# Patient Record
Sex: FEMALE | Race: WHITE | NOT HISPANIC OR LATINO
[De-identification: names, ages, dates, MRNs, and addresses within clinical notes are randomized per-mention and may not be internally consistent; named-entity substitution may affect disease eponyms.]

---

## 2020-07-08 PROBLEM — Z00.00 ENCOUNTER FOR PREVENTIVE HEALTH EXAMINATION: Status: ACTIVE | Noted: 2020-07-08

## 2020-07-22 ENCOUNTER — APPOINTMENT (OUTPATIENT)
Dept: ORTHOPEDIC SURGERY | Facility: CLINIC | Age: 65
End: 2020-07-22

## 2023-07-12 ENCOUNTER — OFFICE (OUTPATIENT)
Dept: URBAN - METROPOLITAN AREA CLINIC 110 | Facility: CLINIC | Age: 68
Setting detail: OPHTHALMOLOGY
End: 2023-07-12
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
## 2023-07-12 PROCEDURE — NO SHOW FE NO SHOW FEE: Performed by: OPHTHALMOLOGY

## 2023-12-21 ENCOUNTER — APPOINTMENT (OUTPATIENT)
Dept: CARDIOLOGY | Facility: CLINIC | Age: 68
End: 2023-12-21

## 2024-01-09 ENCOUNTER — APPOINTMENT (OUTPATIENT)
Dept: CARDIOLOGY | Facility: CLINIC | Age: 69
End: 2024-01-09
Payer: COMMERCIAL

## 2024-01-09 ENCOUNTER — NON-APPOINTMENT (OUTPATIENT)
Age: 69
End: 2024-01-09

## 2024-01-09 VITALS
SYSTOLIC BLOOD PRESSURE: 126 MMHG | RESPIRATION RATE: 15 BRPM | HEIGHT: 70 IN | HEART RATE: 83 BPM | OXYGEN SATURATION: 96 % | BODY MASS INDEX: 41.95 KG/M2 | DIASTOLIC BLOOD PRESSURE: 79 MMHG | WEIGHT: 293 LBS

## 2024-01-09 DIAGNOSIS — Z82.49 FAMILY HISTORY OF ISCHEMIC HEART DISEASE AND OTHER DISEASES OF THE CIRCULATORY SYSTEM: ICD-10-CM

## 2024-01-09 DIAGNOSIS — G62.9 POLYNEUROPATHY, UNSPECIFIED: ICD-10-CM

## 2024-01-09 DIAGNOSIS — K21.9 GASTRO-ESOPHAGEAL REFLUX DISEASE W/OUT ESOPHAGITIS: ICD-10-CM

## 2024-01-09 DIAGNOSIS — Z78.9 OTHER SPECIFIED HEALTH STATUS: ICD-10-CM

## 2024-01-09 DIAGNOSIS — Z87.891 PERSONAL HISTORY OF NICOTINE DEPENDENCE: ICD-10-CM

## 2024-01-09 DIAGNOSIS — R61 GENERALIZED HYPERHIDROSIS: ICD-10-CM

## 2024-01-09 PROCEDURE — 99205 OFFICE O/P NEW HI 60 MIN: CPT | Mod: 25

## 2024-01-09 PROCEDURE — 93000 ELECTROCARDIOGRAM COMPLETE: CPT

## 2024-01-09 PROCEDURE — 99204 OFFICE O/P NEW MOD 45 MIN: CPT | Mod: 25

## 2024-01-19 ENCOUNTER — RX ONLY (RX ONLY)
Age: 69
End: 2024-01-19

## 2024-01-19 ENCOUNTER — OFFICE (OUTPATIENT)
Dept: URBAN - METROPOLITAN AREA CLINIC 104 | Facility: CLINIC | Age: 69
Setting detail: OPHTHALMOLOGY
End: 2024-01-19
Payer: COMMERCIAL

## 2024-01-19 DIAGNOSIS — E11.9: ICD-10-CM

## 2024-01-19 DIAGNOSIS — H16.223: ICD-10-CM

## 2024-01-19 DIAGNOSIS — H40.1131: ICD-10-CM

## 2024-01-19 DIAGNOSIS — H25.12: ICD-10-CM

## 2024-01-19 DIAGNOSIS — H25.13: ICD-10-CM

## 2024-01-19 PROBLEM — H40.1132 POAG; BOTH EYES MODERATE: Status: ACTIVE | Noted: 2024-01-19

## 2024-01-19 PROBLEM — H25.11 CATARACT SENILE NUCLEAR SCLEROSIS; RIGHT EYE, LEFT EYE, BOTH EYES: Status: ACTIVE | Noted: 2024-01-19

## 2024-01-19 PROBLEM — H43.813 POSTERIOR VITREOUS DETACHMENT; BOTH EYES: Status: ACTIVE | Noted: 2024-01-19

## 2024-01-19 PROCEDURE — 92133 CPTRZD OPH DX IMG PST SGM ON: CPT | Performed by: OPHTHALMOLOGY

## 2024-01-19 PROCEDURE — 92136 OPHTHALMIC BIOMETRY: CPT | Mod: TC | Performed by: OPHTHALMOLOGY

## 2024-01-19 PROCEDURE — 92014 COMPRE OPH EXAM EST PT 1/>: CPT | Performed by: OPHTHALMOLOGY

## 2024-01-19 PROCEDURE — 92136 OPHTHALMIC BIOMETRY: CPT | Mod: 26,LT | Performed by: OPHTHALMOLOGY

## 2024-01-19 ASSESSMENT — REFRACTION_MANIFEST
OD_CYLINDER: -0.75
OD_SPHERE: -1.00
OS_VA1: 20/40
OS_SPHERE: -3.25
OS_CYLINDER: -1.00
OD_VA1: 20/40
OS_AXIS: 130
OD_AXIS: 160

## 2024-01-19 ASSESSMENT — SUPERFICIAL PUNCTATE KERATITIS (SPK)
OS_SPK: 1+
OD_SPK: T

## 2024-01-19 ASSESSMENT — LID POSITION - PTOSIS
OS_PTOSIS: LUL 1+
OD_PTOSIS: RUL 1+

## 2024-01-19 ASSESSMENT — SPHEQUIV_DERIVED
OS_SPHEQUIV: -3.75
OD_SPHEQUIV: -1.375
OD_SPHEQUIV: -1.125
OS_SPHEQUIV: -3.75

## 2024-01-19 ASSESSMENT — REFRACTION_CURRENTRX
OS_OVR_VA: 20/
OS_SPHERE: +0.50
OS_AXIS: 18
OD_CYLINDER: -0.50
OD_ADD: +2.00
OD_SPHERE: +0.75
OD_OVR_VA: 20/
OD_AXIS: 66
OS_CYLINDER: -1.25
OS_ADD: +2.00

## 2024-01-19 ASSESSMENT — REFRACTION_AUTOREFRACTION
OD_SPHERE: -0.75
OS_AXIS: 127
OS_SPHERE: -3.25
OS_CYLINDER: -1.00
OD_AXIS: 157
OD_CYLINDER: -0.75

## 2024-01-19 ASSESSMENT — LID POSITION - DERMATOCHALASIS
OD_DERMATOCHALASIS: RUL 2+
OS_DERMATOCHALASIS: LUL 2+

## 2024-01-19 ASSESSMENT — CONFRONTATIONAL VISUAL FIELD TEST (CVF)
OS_FINDINGS: FULL
OD_FINDINGS: FULL

## 2024-02-08 ENCOUNTER — RX ONLY (RX ONLY)
Age: 69
End: 2024-02-08

## 2024-02-08 ENCOUNTER — ASC (OUTPATIENT)
Dept: URBAN - METROPOLITAN AREA SURGERY 8 | Facility: SURGERY | Age: 69
Setting detail: OPHTHALMOLOGY
End: 2024-02-08
Payer: COMMERCIAL

## 2024-02-08 DIAGNOSIS — H25.89: ICD-10-CM

## 2024-02-08 DIAGNOSIS — H52.212: ICD-10-CM

## 2024-02-08 DIAGNOSIS — H25.12: ICD-10-CM

## 2024-02-08 PROCEDURE — V2787 ASTIGMATISM-CORRECT FUNCTION: HCPCS | Performed by: OPHTHALMOLOGY

## 2024-02-08 PROCEDURE — 66982 XCAPSL CTRC RMVL CPLX WO ECP: CPT | Mod: LT | Performed by: OPHTHALMOLOGY

## 2024-02-09 ENCOUNTER — OFFICE (OUTPATIENT)
Dept: URBAN - METROPOLITAN AREA CLINIC 104 | Facility: CLINIC | Age: 69
Setting detail: OPHTHALMOLOGY
End: 2024-02-09
Payer: COMMERCIAL

## 2024-02-09 DIAGNOSIS — Z96.1: ICD-10-CM

## 2024-02-09 PROCEDURE — 99024 POSTOP FOLLOW-UP VISIT: CPT | Performed by: OPHTHALMOLOGY

## 2024-02-09 ASSESSMENT — LID POSITION - PTOSIS
OS_PTOSIS: LUL 1+
OD_PTOSIS: RUL 1+

## 2024-02-09 ASSESSMENT — REFRACTION_CURRENTRX
OD_AXIS: 66
OS_AXIS: 18
OD_SPHERE: +0.75
OS_OVR_VA: 20/
OS_CYLINDER: -1.25
OD_CYLINDER: -0.50
OS_SPHERE: +0.50
OS_ADD: +2.00
OD_OVR_VA: 20/
OD_ADD: +2.00

## 2024-02-09 ASSESSMENT — REFRACTION_AUTOREFRACTION
OD_AXIS: 157
OS_CYLINDER: -1.00
OD_CYLINDER: -0.75
OS_SPHERE: -3.25
OS_AXIS: 127
OD_SPHERE: -0.75

## 2024-02-09 ASSESSMENT — REFRACTION_MANIFEST
OD_AXIS: 160
OD_VA1: 20/40
OD_SPHERE: -1.00
OD_CYLINDER: -0.75

## 2024-02-09 ASSESSMENT — LID POSITION - DERMATOCHALASIS
OD_DERMATOCHALASIS: RUL 2+
OS_DERMATOCHALASIS: LUL 2+

## 2024-02-09 ASSESSMENT — CONFRONTATIONAL VISUAL FIELD TEST (CVF)
OD_FINDINGS: FULL
OS_FINDINGS: FULL

## 2024-02-09 ASSESSMENT — SPHEQUIV_DERIVED
OD_SPHEQUIV: -1.375
OS_SPHEQUIV: -3.75
OD_SPHEQUIV: -1.125

## 2024-02-09 ASSESSMENT — SUPERFICIAL PUNCTATE KERATITIS (SPK)
OD_SPK: T
OS_SPK: 1+

## 2024-02-09 ASSESSMENT — CORNEAL EDEMA CLINICAL DESCRIPTION: OS_CORNEALEDEMA: T

## 2024-02-16 ENCOUNTER — APPOINTMENT (OUTPATIENT)
Dept: CARDIOLOGY | Facility: CLINIC | Age: 69
End: 2024-02-16

## 2024-02-16 ENCOUNTER — OFFICE (OUTPATIENT)
Dept: URBAN - METROPOLITAN AREA CLINIC 104 | Facility: CLINIC | Age: 69
Setting detail: OPHTHALMOLOGY
End: 2024-02-16
Payer: COMMERCIAL

## 2024-02-16 DIAGNOSIS — Z96.1: ICD-10-CM

## 2024-02-16 PROBLEM — H25.811 CATARACT SENILE NUCLEAR SCLEROSIS; RIGHT EYE,: Status: ACTIVE | Noted: 2024-02-16

## 2024-02-16 PROBLEM — H25.11 CATARACT SENILE NUCLEAR SCLEROSIS; RIGHT EYE,: Status: ACTIVE | Noted: 2024-02-16

## 2024-02-16 PROCEDURE — 99024 POSTOP FOLLOW-UP VISIT: CPT | Performed by: OPTOMETRIST

## 2024-02-16 ASSESSMENT — LID POSITION - DERMATOCHALASIS
OS_DERMATOCHALASIS: LUL 2+
OD_DERMATOCHALASIS: RUL 2+

## 2024-02-16 ASSESSMENT — LID POSITION - PTOSIS
OD_PTOSIS: RUL 1+
OS_PTOSIS: LUL 1+

## 2024-02-16 ASSESSMENT — CONFRONTATIONAL VISUAL FIELD TEST (CVF)
OD_FINDINGS: FULL
OS_FINDINGS: FULL

## 2024-02-16 ASSESSMENT — SUPERFICIAL PUNCTATE KERATITIS (SPK)
OS_SPK: 1+
OD_SPK: T

## 2024-02-16 ASSESSMENT — REFRACTION_MANIFEST
OD_SPHERE: -1.00
OD_VA1: 20/40

## 2024-02-16 ASSESSMENT — REFRACTION_CURRENTRX
OD_OVR_VA: 20/
OS_OVR_VA: 20/

## 2024-02-16 ASSESSMENT — SPHEQUIV_DERIVED: OS_SPHEQUIV: 0.375

## 2024-02-16 ASSESSMENT — REFRACTION_AUTOREFRACTION
OS_AXIS: 90
OD_SPHERE: -0.50
OS_CYLINDER: -0.75
OS_SPHERE: +0.75

## 2024-02-16 ASSESSMENT — CORNEAL EDEMA CLINICAL DESCRIPTION: OS_CORNEALEDEMA: T

## 2024-02-21 ENCOUNTER — APPOINTMENT (OUTPATIENT)
Dept: CARDIOLOGY | Facility: CLINIC | Age: 69
End: 2024-02-21
Payer: COMMERCIAL

## 2024-02-21 PROCEDURE — 93306 TTE W/DOPPLER COMPLETE: CPT

## 2024-02-21 RX ADMIN — PERFLUTREN MG/ML: 6.52 INJECTION, SUSPENSION INTRAVENOUS at 00:00

## 2024-02-26 RX ORDER — PERFLUTREN 6.52 MG/ML
6.52 INJECTION, SUSPENSION INTRAVENOUS
Qty: 2 | Refills: 0 | Status: COMPLETED | OUTPATIENT
Start: 2024-02-21

## 2024-03-04 ENCOUNTER — OFFICE (OUTPATIENT)
Dept: URBAN - METROPOLITAN AREA CLINIC 104 | Facility: CLINIC | Age: 69
Setting detail: OPHTHALMOLOGY
End: 2024-03-04
Payer: COMMERCIAL

## 2024-03-04 DIAGNOSIS — H26.492: ICD-10-CM

## 2024-03-04 DIAGNOSIS — H59.032: ICD-10-CM

## 2024-03-04 DIAGNOSIS — H16.223: ICD-10-CM

## 2024-03-04 DIAGNOSIS — H25.11: ICD-10-CM

## 2024-03-04 PROCEDURE — 92136 OPHTHALMIC BIOMETRY: CPT | Mod: RT | Performed by: OPHTHALMOLOGY

## 2024-03-04 PROCEDURE — 92134 CPTRZ OPH DX IMG PST SGM RTA: CPT | Performed by: OPHTHALMOLOGY

## 2024-03-04 PROCEDURE — 99213 OFFICE O/P EST LOW 20 MIN: CPT | Mod: 24 | Performed by: OPHTHALMOLOGY

## 2024-03-04 ASSESSMENT — LID POSITION - PTOSIS
OS_PTOSIS: LUL 1+
OD_PTOSIS: RUL 1+

## 2024-03-04 ASSESSMENT — REFRACTION_MANIFEST
OD_SPHERE: -1.00
OD_VA1: 20/40

## 2024-03-04 ASSESSMENT — LID POSITION - DERMATOCHALASIS
OS_DERMATOCHALASIS: LUL 2+
OD_DERMATOCHALASIS: RUL 2+

## 2024-03-08 ENCOUNTER — OFFICE (OUTPATIENT)
Dept: URBAN - METROPOLITAN AREA CLINIC 104 | Facility: CLINIC | Age: 69
Setting detail: OPHTHALMOLOGY
End: 2024-03-08
Payer: COMMERCIAL

## 2024-03-08 DIAGNOSIS — H59.032: ICD-10-CM

## 2024-03-08 PROCEDURE — 92134 CPTRZ OPH DX IMG PST SGM RTA: CPT | Performed by: SPECIALIST

## 2024-03-08 PROCEDURE — 92235 FLUORESCEIN ANGRPH MLTIFRAME: CPT | Performed by: SPECIALIST

## 2024-03-08 PROCEDURE — 92012 INTRM OPH EXAM EST PATIENT: CPT | Mod: 25 | Performed by: SPECIALIST

## 2024-03-08 ASSESSMENT — LID POSITION - DERMATOCHALASIS
OS_DERMATOCHALASIS: LUL 2+
OD_DERMATOCHALASIS: RUL 2+

## 2024-03-08 ASSESSMENT — LID POSITION - PTOSIS
OD_PTOSIS: RUL 1+
OS_PTOSIS: LUL 1+

## 2024-03-11 ENCOUNTER — RX ONLY (RX ONLY)
Age: 69
End: 2024-03-11

## 2024-03-11 ENCOUNTER — OFFICE (OUTPATIENT)
Dept: URBAN - METROPOLITAN AREA CLINIC 104 | Facility: CLINIC | Age: 69
Setting detail: OPHTHALMOLOGY
End: 2024-03-11
Payer: COMMERCIAL

## 2024-03-11 DIAGNOSIS — H26.492: ICD-10-CM

## 2024-03-11 PROCEDURE — 99024 POSTOP FOLLOW-UP VISIT: CPT | Mod: 79 | Performed by: OPHTHALMOLOGY

## 2024-03-11 PROCEDURE — 66821 AFTER CATARACT LASER SURGERY: CPT | Mod: 79,LT | Performed by: OPHTHALMOLOGY

## 2024-03-11 ASSESSMENT — SPHEQUIV_DERIVED: OS_SPHEQUIV: 0.75

## 2024-03-11 ASSESSMENT — LID POSITION - DERMATOCHALASIS
OS_DERMATOCHALASIS: LUL 2+
OD_DERMATOCHALASIS: RUL 2+

## 2024-03-11 ASSESSMENT — REFRACTION_MANIFEST
OS_AXIS: 82
OS_VA1: 20/50
OS_SPHERE: +1.75
OS_CYLINDER: -2.00

## 2024-03-11 ASSESSMENT — LID POSITION - PTOSIS
OD_PTOSIS: RUL 1+
OS_PTOSIS: LUL 1+

## 2024-03-12 ENCOUNTER — APPOINTMENT (OUTPATIENT)
Dept: CARDIOLOGY | Facility: CLINIC | Age: 69
End: 2024-03-12
Payer: COMMERCIAL

## 2024-03-12 PROCEDURE — A9500: CPT

## 2024-03-12 PROCEDURE — 93015 CV STRESS TEST SUPVJ I&R: CPT

## 2024-03-12 PROCEDURE — 78452 HT MUSCLE IMAGE SPECT MULT: CPT

## 2024-03-12 RX ORDER — REGADENOSON 0.08 MG/ML
0.4 INJECTION, SOLUTION INTRAVENOUS
Refills: 0 | Status: COMPLETED | OUTPATIENT
Start: 2024-03-12

## 2024-03-12 RX ADMIN — REGADENOSON 0.4 MG/5ML: 0.08 INJECTION, SOLUTION INTRAVENOUS at 00:00

## 2024-03-13 ENCOUNTER — OFFICE (OUTPATIENT)
Dept: URBAN - METROPOLITAN AREA CLINIC 63 | Facility: CLINIC | Age: 69
Setting detail: OPHTHALMOLOGY
End: 2024-03-13
Payer: COMMERCIAL

## 2024-03-13 ENCOUNTER — APPOINTMENT (OUTPATIENT)
Dept: CARDIOLOGY | Facility: CLINIC | Age: 69
End: 2024-03-13

## 2024-03-13 DIAGNOSIS — H25.11: ICD-10-CM

## 2024-03-13 DIAGNOSIS — Z96.1: ICD-10-CM

## 2024-03-13 DIAGNOSIS — H26.492: ICD-10-CM

## 2024-03-13 DIAGNOSIS — H16.223: ICD-10-CM

## 2024-03-13 PROCEDURE — 99024 POSTOP FOLLOW-UP VISIT: CPT | Performed by: OPHTHALMOLOGY

## 2024-03-13 ASSESSMENT — LID POSITION - PTOSIS
OS_PTOSIS: LUL 1+
OD_PTOSIS: RUL 1+

## 2024-03-13 ASSESSMENT — SPHEQUIV_DERIVED: OS_SPHEQUIV: 0.25

## 2024-03-13 ASSESSMENT — REFRACTION_MANIFEST
OS_CYLINDER: -0.50
OS_SPHERE: +0.50
OS_VA1: 20/30
OS_AXIS: 065

## 2024-03-13 ASSESSMENT — LID POSITION - DERMATOCHALASIS
OD_DERMATOCHALASIS: RUL 2+
OS_DERMATOCHALASIS: LUL 2+

## 2024-03-21 ENCOUNTER — APPOINTMENT (OUTPATIENT)
Dept: CARDIOLOGY | Facility: CLINIC | Age: 69
End: 2024-03-21
Payer: COMMERCIAL

## 2024-03-21 VITALS
HEIGHT: 70 IN | OXYGEN SATURATION: 97 % | HEART RATE: 80 BPM | RESPIRATION RATE: 16 BRPM | DIASTOLIC BLOOD PRESSURE: 78 MMHG | WEIGHT: 293 LBS | BODY MASS INDEX: 41.95 KG/M2 | SYSTOLIC BLOOD PRESSURE: 124 MMHG

## 2024-03-21 DIAGNOSIS — E11.9 TYPE 2 DIABETES MELLITUS W/OUT COMPLICATIONS: ICD-10-CM

## 2024-03-21 DIAGNOSIS — E78.00 PURE HYPERCHOLESTEROLEMIA, UNSPECIFIED: ICD-10-CM

## 2024-03-21 DIAGNOSIS — R42 DIZZINESS AND GIDDINESS: ICD-10-CM

## 2024-03-21 DIAGNOSIS — R06.09 OTHER FORMS OF DYSPNEA: ICD-10-CM

## 2024-03-21 DIAGNOSIS — R07.89 OTHER CHEST PAIN: ICD-10-CM

## 2024-03-21 DIAGNOSIS — I10 ESSENTIAL (PRIMARY) HYPERTENSION: ICD-10-CM

## 2024-03-21 DIAGNOSIS — R60.0 LOCALIZED EDEMA: ICD-10-CM

## 2024-03-21 PROCEDURE — G2211 COMPLEX E/M VISIT ADD ON: CPT

## 2024-03-21 PROCEDURE — 99214 OFFICE O/P EST MOD 30 MIN: CPT
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
## 2024-03-21 NOTE — ASSESSMENT
[FreeTextEntry1] :  ECG: SR, borderline IVCD   ECHO 2/2024: 1. TDS 2. Normal LV size, systolic and diastolic function. EF 65-70% 3. No clinically significant valvular disease  4. Ascending aorta 4.1cm, 1.59cm/m2  PHARM NUCLEAR STRESS 2/2024: 1. Negative for ischemia/infarct, EF 67% 2. Resting /76 and peak 110/62

## 2024-03-21 NOTE — PHYSICAL EXAM
[Normal] : normal S1, S2, no murmur, no rub, no gallop [Soft] : abdomen soft [Moves all extremities] : moves all extremities [Alert and Oriented] : alert and oriented [de-identified] : obese [de-identified] : trace edema [de-identified] : sofya

## 2024-03-21 NOTE — DISCUSSION/SUMMARY
[FreeTextEntry1] : Patient is a 67yo F DM, HTN, HLD, family history CAD, morbid obesity, former smoker  here for follow up  -ECG essentially unremarkable, borderline IVCD is non-specific  -Multiple risk factors and family history CAD as well  -Cardiac work up negative, echo 2/2024 with normal BiV function and no valve disease -Pharm nuclear stress 2/2024 negative  -CV stable at this time, edema from weight and CVI, adivsed elevation/weight loss and compression stockigns  1. CV stable, continue aggressive risk factor modification 2. Recommend 30 minutes moderate intensity aerobic activity 5 days per week as tolerated 3. Continue statin, regular BW with PMD  4. Diabetes management per PMD. Counselled on diet/weight loss  5. Regular follow up Dr Dhaliwal, neurology and endocrine 6. Need to obtain her medication list still as well  7. Compression stockings for edema/CVI and leg elevation, vascular evaluation if progress  8. 2 week MCOT to eval wooziness/dizziness/near syncope, seems more neurologic but will ensure no heart block/SND/dysrhythmia. Call with results, otherwise annual follow up

## 2024-03-25 ENCOUNTER — RX ONLY (RX ONLY)
Age: 69
End: 2024-03-25

## 2024-03-25 ENCOUNTER — OFFICE (OUTPATIENT)
Dept: URBAN - METROPOLITAN AREA CLINIC 104 | Facility: CLINIC | Age: 69
Setting detail: OPHTHALMOLOGY
End: 2024-03-25
Payer: COMMERCIAL

## 2024-03-25 DIAGNOSIS — H16.223: ICD-10-CM

## 2024-03-25 DIAGNOSIS — H26.492: ICD-10-CM

## 2024-03-25 DIAGNOSIS — Z96.1: ICD-10-CM

## 2024-03-25 DIAGNOSIS — H25.11: ICD-10-CM

## 2024-03-25 PROBLEM — H59.032 CME FOLLOWING CATARACT SURGERY; LEFT EYE: Status: RESOLVED | Noted: 2024-03-04 | Resolved: 2024-03-25

## 2024-03-25 PROCEDURE — 99024 POSTOP FOLLOW-UP VISIT: CPT | Performed by: OPHTHALMOLOGY

## 2024-03-25 ASSESSMENT — REFRACTION_MANIFEST
OD_VA1: 20/30
OD_SPHERE: -0.25
OD_CYLINDER: -0.75
OD_AXIS: 170

## 2024-03-25 ASSESSMENT — LID POSITION - DERMATOCHALASIS
OS_DERMATOCHALASIS: LUL 2+
OD_DERMATOCHALASIS: RUL 2+

## 2024-03-25 ASSESSMENT — SPHEQUIV_DERIVED: OD_SPHEQUIV: -0.625

## 2024-03-25 ASSESSMENT — LID POSITION - PTOSIS
OD_PTOSIS: RUL 1+
OS_PTOSIS: LUL 1+

## 2024-03-28 ENCOUNTER — ASC (OUTPATIENT)
Dept: URBAN - METROPOLITAN AREA SURGERY 8 | Facility: SURGERY | Age: 69
Setting detail: OPHTHALMOLOGY
End: 2024-03-28
Payer: COMMERCIAL

## 2024-03-28 DIAGNOSIS — H25.89: ICD-10-CM

## 2024-03-28 DIAGNOSIS — H25.11: ICD-10-CM

## 2024-03-28 DIAGNOSIS — H52.211: ICD-10-CM

## 2024-03-28 PROCEDURE — V2787 ASTIGMATISM-CORRECT FUNCTION: HCPCS | Performed by: OPHTHALMOLOGY

## 2024-03-28 PROCEDURE — 66982 XCAPSL CTRC RMVL CPLX WO ECP: CPT | Mod: 79,RT | Performed by: OPHTHALMOLOGY

## 2024-03-29 ENCOUNTER — OFFICE (OUTPATIENT)
Dept: URBAN - METROPOLITAN AREA CLINIC 104 | Facility: CLINIC | Age: 69
Setting detail: OPHTHALMOLOGY
End: 2024-03-29
Payer: COMMERCIAL

## 2024-03-29 ENCOUNTER — RX ONLY (RX ONLY)
Age: 69
End: 2024-03-29

## 2024-03-29 DIAGNOSIS — Z96.1: ICD-10-CM

## 2024-03-29 PROBLEM — H26.492 POSTERIOR CAPSULAR OPACIFICATION; LEFT EYE: Status: ACTIVE | Noted: 2024-03-04

## 2024-03-29 PROCEDURE — 99024 POSTOP FOLLOW-UP VISIT: CPT | Performed by: OPHTHALMOLOGY

## 2024-03-29 ASSESSMENT — LID POSITION - PTOSIS
OS_PTOSIS: LUL 1+
OD_PTOSIS: RUL 1+

## 2024-03-29 ASSESSMENT — LID POSITION - DERMATOCHALASIS
OS_DERMATOCHALASIS: LUL 2+
OD_DERMATOCHALASIS: RUL 2+

## 2024-04-08 ENCOUNTER — OFFICE (OUTPATIENT)
Dept: URBAN - METROPOLITAN AREA CLINIC 104 | Facility: CLINIC | Age: 69
Setting detail: OPHTHALMOLOGY
End: 2024-04-08
Payer: COMMERCIAL

## 2024-04-08 ENCOUNTER — RX ONLY (RX ONLY)
Age: 69
End: 2024-04-08

## 2024-04-08 DIAGNOSIS — E11.9: ICD-10-CM

## 2024-04-08 DIAGNOSIS — H16.223: ICD-10-CM

## 2024-04-08 DIAGNOSIS — H43.813: ICD-10-CM

## 2024-04-08 DIAGNOSIS — Z96.1: ICD-10-CM

## 2024-04-08 DIAGNOSIS — H40.1132: ICD-10-CM

## 2024-04-08 DIAGNOSIS — H11.153: ICD-10-CM

## 2024-04-08 PROCEDURE — 99024 POSTOP FOLLOW-UP VISIT: CPT | Performed by: OPHTHALMOLOGY

## 2024-04-08 ASSESSMENT — LID POSITION - DERMATOCHALASIS
OD_DERMATOCHALASIS: RUL 2+
OS_DERMATOCHALASIS: LUL 2+

## 2024-04-08 ASSESSMENT — LID POSITION - PTOSIS
OS_PTOSIS: LUL 1+
OD_PTOSIS: RUL 1+

## 2024-04-10 RX ORDER — KIT FOR THE PREPARATION OF TECHNETIUM TC99M SESTAMIBI 1 MG/5ML
INJECTION, POWDER, LYOPHILIZED, FOR SOLUTION PARENTERAL
Refills: 0 | Status: COMPLETED | OUTPATIENT
Start: 2024-04-10

## 2024-04-10 RX ADMIN — KIT FOR THE PREPARATION OF TECHNETIUM TC99M SESTAMIBI 0: 1 INJECTION, POWDER, LYOPHILIZED, FOR SOLUTION PARENTERAL at 00:00

## 2024-04-29 ENCOUNTER — OFFICE (OUTPATIENT)
Dept: URBAN - METROPOLITAN AREA CLINIC 94 | Facility: CLINIC | Age: 69
Setting detail: OPHTHALMOLOGY
End: 2024-04-29
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
## 2024-04-29 PROCEDURE — NO SHOW FE NO SHOW FEE: Performed by: SPECIALIST

## 2024-05-02 ENCOUNTER — OFFICE (OUTPATIENT)
Dept: URBAN - METROPOLITAN AREA CLINIC 104 | Facility: CLINIC | Age: 69
Setting detail: OPHTHALMOLOGY
End: 2024-05-02
Payer: COMMERCIAL

## 2024-05-02 DIAGNOSIS — H40.1132: ICD-10-CM

## 2024-05-02 DIAGNOSIS — H02.834: ICD-10-CM

## 2024-05-02 DIAGNOSIS — H43.813: ICD-10-CM

## 2024-05-02 DIAGNOSIS — H02.403: ICD-10-CM

## 2024-05-02 DIAGNOSIS — H16.223: ICD-10-CM

## 2024-05-02 DIAGNOSIS — H11.153: ICD-10-CM

## 2024-05-02 DIAGNOSIS — H02.831: ICD-10-CM

## 2024-05-02 DIAGNOSIS — Z96.1: ICD-10-CM

## 2024-05-02 PROCEDURE — 99024 POSTOP FOLLOW-UP VISIT: CPT | Performed by: OPHTHALMOLOGY

## 2024-05-02 ASSESSMENT — LID POSITION - PTOSIS
OD_PTOSIS: RUL 1+
OS_PTOSIS: LUL 1+

## 2024-05-02 ASSESSMENT — LID POSITION - DERMATOCHALASIS
OD_DERMATOCHALASIS: RUL 2+
OS_DERMATOCHALASIS: LUL 2+

## 2024-05-31 PROBLEM — H34.8310 BRANCH RETINAL VEIN OCCLUSION; RIGHT EYE W/ MACULAR EDEMA: Status: ACTIVE | Noted: 2024-05-31

## 2024-06-05 ENCOUNTER — RX ONLY (RX ONLY)
Age: 69
End: 2024-06-05

## 2024-06-05 ENCOUNTER — OFFICE (OUTPATIENT)
Dept: URBAN - METROPOLITAN AREA CLINIC 94 | Facility: CLINIC | Age: 69
Setting detail: OPHTHALMOLOGY
End: 2024-06-05
Payer: COMMERCIAL

## 2024-06-05 DIAGNOSIS — G45.9: ICD-10-CM

## 2024-06-05 DIAGNOSIS — G45.3: ICD-10-CM

## 2024-06-05 DIAGNOSIS — E11.3293: ICD-10-CM

## 2024-06-05 DIAGNOSIS — H34.8311: ICD-10-CM

## 2024-06-05 PROCEDURE — 92235 FLUORESCEIN ANGRPH MLTIFRAME: CPT | Performed by: OPHTHALMOLOGY

## 2024-06-05 PROCEDURE — 99214 OFFICE O/P EST MOD 30 MIN: CPT | Mod: 24 | Performed by: OPHTHALMOLOGY

## 2024-06-05 PROCEDURE — 92134 CPTRZ OPH DX IMG PST SGM RTA: CPT | Performed by: OPHTHALMOLOGY

## 2024-06-05 ASSESSMENT — LID POSITION - PTOSIS
OD_PTOSIS: RUL 1+
OS_PTOSIS: LUL 1+

## 2024-06-05 ASSESSMENT — CONFRONTATIONAL VISUAL FIELD TEST (CVF)
OD_FINDINGS: FULL
OS_FINDINGS: FULL

## 2024-06-05 ASSESSMENT — LID POSITION - DERMATOCHALASIS
OD_DERMATOCHALASIS: RUL 2+
OS_DERMATOCHALASIS: LUL 2+

## 2024-08-01 ENCOUNTER — OFFICE (OUTPATIENT)
Dept: URBAN - METROPOLITAN AREA CLINIC 104 | Facility: CLINIC | Age: 69
Setting detail: OPHTHALMOLOGY
End: 2024-08-01
Payer: COMMERCIAL

## 2024-08-01 DIAGNOSIS — H16.223: ICD-10-CM

## 2024-08-01 DIAGNOSIS — H40.1132: ICD-10-CM

## 2024-08-01 DIAGNOSIS — H02.403: ICD-10-CM

## 2024-08-01 DIAGNOSIS — E11.3293: ICD-10-CM

## 2024-08-01 PROCEDURE — 92014 COMPRE OPH EXAM EST PT 1/>: CPT | Performed by: OPHTHALMOLOGY

## 2024-08-01 ASSESSMENT — CONFRONTATIONAL VISUAL FIELD TEST (CVF)
OS_FINDINGS: FULL
OD_FINDINGS: FULL

## 2024-08-01 ASSESSMENT — LID POSITION - DERMATOCHALASIS
OD_DERMATOCHALASIS: RUL 2+
OS_DERMATOCHALASIS: LUL 2+

## 2024-08-01 ASSESSMENT — LID POSITION - PTOSIS
OD_PTOSIS: RUL 1+
OS_PTOSIS: LUL 1+

## 2024-09-23 ENCOUNTER — RX ONLY (RX ONLY)
Age: 69
End: 2024-09-23

## 2024-09-23 ENCOUNTER — OFFICE (OUTPATIENT)
Dept: URBAN - METROPOLITAN AREA CLINIC 104 | Facility: CLINIC | Age: 69
Setting detail: OPHTHALMOLOGY
End: 2024-09-23
Payer: COMMERCIAL

## 2024-09-23 DIAGNOSIS — G45.9: ICD-10-CM

## 2024-09-23 DIAGNOSIS — H52.4: ICD-10-CM

## 2024-09-23 DIAGNOSIS — H16.223: ICD-10-CM

## 2024-09-23 DIAGNOSIS — H40.1132: ICD-10-CM

## 2024-09-23 DIAGNOSIS — H11.153: ICD-10-CM

## 2024-09-23 DIAGNOSIS — G45.3: ICD-10-CM

## 2024-09-23 DIAGNOSIS — H02.403: ICD-10-CM

## 2024-09-23 DIAGNOSIS — Z96.1: ICD-10-CM

## 2024-09-23 DIAGNOSIS — H34.8311: ICD-10-CM

## 2024-09-23 PROCEDURE — 99213 OFFICE O/P EST LOW 20 MIN: CPT | Performed by: OPHTHALMOLOGY

## 2024-09-23 PROCEDURE — 92083 EXTENDED VISUAL FIELD XM: CPT | Performed by: OPHTHALMOLOGY

## 2024-09-23 PROCEDURE — 92134 CPTRZ OPH DX IMG PST SGM RTA: CPT | Performed by: OPHTHALMOLOGY

## 2024-09-23 PROCEDURE — 92015 DETERMINE REFRACTIVE STATE: CPT | Performed by: OPHTHALMOLOGY

## 2024-09-23 ASSESSMENT — LID POSITION - PTOSIS
OS_PTOSIS: LUL 1+
OD_PTOSIS: RUL 1+

## 2024-09-23 ASSESSMENT — LID POSITION - DERMATOCHALASIS
OD_DERMATOCHALASIS: RUL 2+
OS_DERMATOCHALASIS: LUL 2+

## 2024-10-08 ENCOUNTER — OFFICE (OUTPATIENT)
Dept: URBAN - METROPOLITAN AREA CLINIC 94 | Facility: CLINIC | Age: 69
Setting detail: OPHTHALMOLOGY
End: 2024-10-08
Payer: COMMERCIAL

## 2024-10-08 DIAGNOSIS — H43.813: ICD-10-CM

## 2024-10-08 DIAGNOSIS — H34.8311: ICD-10-CM

## 2024-10-08 DIAGNOSIS — E11.3293: ICD-10-CM

## 2024-10-08 PROCEDURE — 92134 CPTRZ OPH DX IMG PST SGM RTA: CPT | Performed by: OPHTHALMOLOGY

## 2024-10-08 PROCEDURE — 92012 INTRM OPH EXAM EST PATIENT: CPT | Performed by: OPHTHALMOLOGY

## 2024-10-08 ASSESSMENT — REFRACTION_AUTOREFRACTION
OS_CYLINDER: -1.50
OS_AXIS: 81
OD_CYLINDER: -1.00
OD_SPHERE: +0.75
OD_AXIS: 135
OS_SPHERE: +1.50

## 2024-10-08 ASSESSMENT — VISUAL ACUITY
OD_BCVA: 20/30-1
OS_BCVA: 20/40

## 2024-10-08 ASSESSMENT — LID POSITION - DERMATOCHALASIS
OD_DERMATOCHALASIS: RUL 2+
OS_DERMATOCHALASIS: LUL 2+

## 2024-10-08 ASSESSMENT — CONFRONTATIONAL VISUAL FIELD TEST (CVF)
OS_FINDINGS: FULL
OD_FINDINGS: FULL

## 2024-10-08 ASSESSMENT — LID POSITION - PTOSIS
OS_PTOSIS: LUL 1+
OD_PTOSIS: RUL 1+

## 2024-10-08 ASSESSMENT — SUPERFICIAL PUNCTATE KERATITIS (SPK)
OS_SPK: T
OD_SPK: T

## 2024-10-08 ASSESSMENT — TEAR BREAK UP TIME (TBUT)
OS_TBUT: 1+ 2+
OD_TBUT: 1+ 2+

## 2024-10-08 ASSESSMENT — CORNEAL EDEMA CLINICAL DESCRIPTION: OS_CORNEALEDEMA: ABSENT

## 2024-12-13 ENCOUNTER — NON-APPOINTMENT (OUTPATIENT)
Age: 69
End: 2024-12-13

## 2024-12-13 RX ORDER — ATORVASTATIN CALCIUM 40 MG/1
40 TABLET, FILM COATED ORAL
Qty: 90 | Refills: 1 | Status: ACTIVE | COMMUNITY
Start: 2024-12-11 | End: 1900-01-01

## 2025-01-07 ENCOUNTER — RX ONLY (RX ONLY)
Age: 70
End: 2025-01-07

## 2025-01-07 ENCOUNTER — OFFICE (OUTPATIENT)
Dept: URBAN - METROPOLITAN AREA CLINIC 94 | Facility: CLINIC | Age: 70
Setting detail: OPHTHALMOLOGY
End: 2025-01-07
Payer: COMMERCIAL

## 2025-01-07 DIAGNOSIS — H43.813: ICD-10-CM

## 2025-01-07 DIAGNOSIS — H34.8311: ICD-10-CM

## 2025-01-07 DIAGNOSIS — G45.9: ICD-10-CM

## 2025-01-07 DIAGNOSIS — E11.3293: ICD-10-CM

## 2025-01-07 PROCEDURE — 92134 CPTRZ OPH DX IMG PST SGM RTA: CPT | Performed by: OPHTHALMOLOGY

## 2025-01-07 PROCEDURE — 92012 INTRM OPH EXAM EST PATIENT: CPT | Performed by: OPHTHALMOLOGY

## 2025-01-07 ASSESSMENT — PACHYMETRY
OS_CT_UM: 530
OS_CT_CORRECTION: 1
OD_CT_UM: 530
OD_CT_CORRECTION: 1

## 2025-01-07 ASSESSMENT — CORNEAL EDEMA CLINICAL DESCRIPTION: OS_CORNEALEDEMA: ABSENT

## 2025-01-07 ASSESSMENT — VISUAL ACUITY
OD_BCVA: 20/30-1
OS_BCVA: 20/30+1

## 2025-01-07 ASSESSMENT — LID POSITION - PTOSIS
OS_PTOSIS: LUL 1+
OD_PTOSIS: RUL 1+

## 2025-01-07 ASSESSMENT — CONFRONTATIONAL VISUAL FIELD TEST (CVF)
OD_FINDINGS: FULL
OS_FINDINGS: FULL

## 2025-01-07 ASSESSMENT — TONOMETRY
OS_IOP_MMHG: 14
OD_IOP_MMHG: 13

## 2025-01-07 ASSESSMENT — LID POSITION - DERMATOCHALASIS
OD_DERMATOCHALASIS: RUL 2+
OS_DERMATOCHALASIS: LUL 2+

## 2025-01-07 ASSESSMENT — SUPERFICIAL PUNCTATE KERATITIS (SPK)
OD_SPK: T
OS_SPK: T

## 2025-01-07 ASSESSMENT — REFRACTION_AUTOREFRACTION
OD_CYLINDER: -1.00
OS_CYLINDER: -1.50
OS_SPHERE: +1.50
OD_AXIS: 135
OD_SPHERE: +0.75
OS_AXIS: 81

## 2025-01-07 ASSESSMENT — TEAR BREAK UP TIME (TBUT)
OS_TBUT: 1+ 2+
OD_TBUT: 1+ 2+

## 2025-02-03 ENCOUNTER — OFFICE (OUTPATIENT)
Dept: URBAN - METROPOLITAN AREA CLINIC 104 | Facility: CLINIC | Age: 70
Setting detail: OPHTHALMOLOGY
End: 2025-02-03
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
## 2025-02-03 PROCEDURE — NO SHOW FE NO SHOW FEE: Performed by: OPHTHALMOLOGY

## 2025-03-14 ENCOUNTER — OFFICE (OUTPATIENT)
Dept: URBAN - METROPOLITAN AREA CLINIC 104 | Facility: CLINIC | Age: 70
Setting detail: OPHTHALMOLOGY
End: 2025-03-14
Payer: COMMERCIAL

## 2025-03-14 DIAGNOSIS — H34.8311: ICD-10-CM

## 2025-03-14 DIAGNOSIS — H43.813: ICD-10-CM

## 2025-03-14 DIAGNOSIS — H52.4: ICD-10-CM

## 2025-03-14 PROCEDURE — 99213 OFFICE O/P EST LOW 20 MIN: CPT | Performed by: OPTOMETRIST

## 2025-03-14 PROCEDURE — 92015 DETERMINE REFRACTIVE STATE: CPT | Performed by: OPTOMETRIST

## 2025-03-14 ASSESSMENT — REFRACTION_AUTOREFRACTION
OS_AXIS: 077
OD_AXIS: 109
OD_CYLINDER: -1.00
OS_CYLINDER: -1.25
OS_SPHERE: +1.00
OD_SPHERE: +1.00

## 2025-03-14 ASSESSMENT — SUPERFICIAL PUNCTATE KERATITIS (SPK)
OS_SPK: T
OD_SPK: T

## 2025-03-14 ASSESSMENT — VISUAL ACUITY
OS_BCVA: 20/50-1
OD_BCVA: 20/40-1

## 2025-03-14 ASSESSMENT — KERATOMETRY
OD_AXISANGLE_DEGREES: 113
OS_AXISANGLE_DEGREES: 084
OD_K2POWER_DIOPTERS: 43.95
OS_K2POWER_DIOPTERS: 44.29
OS_K1POWER_DIOPTERS: 42.99
OD_K1POWER_DIOPTERS: 43.60

## 2025-03-14 ASSESSMENT — LID POSITION - PTOSIS
OS_PTOSIS: LUL 1+
OD_PTOSIS: RUL 1+

## 2025-03-14 ASSESSMENT — REFRACTION_MANIFEST
OS_VA1: 20/30
OS_ADD: +2.50
OD_AXIS: 120
OD_ADD: +2.50
OD_VA1: 20/30-2
OS_CYLINDER: -1.00
OD_CYLINDER: -0.75
OD_SPHERE: +0.75
OS_SPHERE: +1.25
OS_AXIS: 080

## 2025-03-14 ASSESSMENT — LID POSITION - DERMATOCHALASIS
OD_DERMATOCHALASIS: RUL 2+
OS_DERMATOCHALASIS: LUL 2+

## 2025-03-14 ASSESSMENT — TEAR BREAK UP TIME (TBUT)
OS_TBUT: 1+ 2+
OD_TBUT: 1+ 2+

## 2025-03-14 ASSESSMENT — CONFRONTATIONAL VISUAL FIELD TEST (CVF)
OS_FINDINGS: FULL
OD_FINDINGS: FULL

## 2025-04-04 ENCOUNTER — OFFICE (OUTPATIENT)
Dept: URBAN - METROPOLITAN AREA CLINIC 104 | Facility: CLINIC | Age: 70
Setting detail: OPHTHALMOLOGY
End: 2025-04-04
Payer: COMMERCIAL

## 2025-04-04 DIAGNOSIS — H43.813: ICD-10-CM

## 2025-04-04 DIAGNOSIS — E11.9: ICD-10-CM

## 2025-04-04 DIAGNOSIS — H40.1132: ICD-10-CM

## 2025-04-04 PROCEDURE — 92012 INTRM OPH EXAM EST PATIENT: CPT | Performed by: OPHTHALMOLOGY

## 2025-04-04 PROCEDURE — 92134 CPTRZ OPH DX IMG PST SGM RTA: CPT | Performed by: OPHTHALMOLOGY

## 2025-04-04 ASSESSMENT — REFRACTION_AUTOREFRACTION
OD_AXIS: 109
OS_AXIS: 077
OS_SPHERE: +1.00
OS_CYLINDER: -1.25
OD_SPHERE: +1.00
OD_CYLINDER: -1.00

## 2025-04-04 ASSESSMENT — KERATOMETRY
OS_K2POWER_DIOPTERS: 44.29
OD_AXISANGLE_DEGREES: 113
OS_K1POWER_DIOPTERS: 42.99
OD_K2POWER_DIOPTERS: 43.95
OS_AXISANGLE_DEGREES: 084
OD_K1POWER_DIOPTERS: 43.60

## 2025-04-04 ASSESSMENT — VISUAL ACUITY
OS_BCVA: 20/50-1
OD_BCVA: 20/50

## 2025-05-12 ENCOUNTER — OFFICE (OUTPATIENT)
Dept: URBAN - METROPOLITAN AREA CLINIC 12 | Facility: CLINIC | Age: 70
Setting detail: OPHTHALMOLOGY
End: 2025-05-12
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
## 2025-05-12 PROCEDURE — NO SHOW FE NO SHOW FEE: Performed by: STUDENT IN AN ORGANIZED HEALTH CARE EDUCATION/TRAINING PROGRAM

## 2025-05-29 ENCOUNTER — OFFICE (OUTPATIENT)
Dept: URBAN - METROPOLITAN AREA CLINIC 104 | Facility: CLINIC | Age: 70
Setting detail: OPHTHALMOLOGY
End: 2025-05-29
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
## 2025-05-29 PROCEDURE — NO SHOW FE NO SHOW FEE: Performed by: OPHTHALMOLOGY

## 2025-06-30 ENCOUNTER — OFFICE (OUTPATIENT)
Dept: URBAN - METROPOLITAN AREA CLINIC 12 | Facility: CLINIC | Age: 70
Setting detail: OPHTHALMOLOGY
End: 2025-06-30
Payer: COMMERCIAL

## 2025-06-30 DIAGNOSIS — H40.1133: ICD-10-CM

## 2025-06-30 PROCEDURE — 92133 CPTRZD OPH DX IMG PST SGM ON: CPT | Performed by: STUDENT IN AN ORGANIZED HEALTH CARE EDUCATION/TRAINING PROGRAM

## 2025-06-30 PROCEDURE — 92020 GONIOSCOPY: CPT | Performed by: STUDENT IN AN ORGANIZED HEALTH CARE EDUCATION/TRAINING PROGRAM

## 2025-06-30 PROCEDURE — 92083 EXTENDED VISUAL FIELD XM: CPT | Performed by: STUDENT IN AN ORGANIZED HEALTH CARE EDUCATION/TRAINING PROGRAM

## 2025-06-30 PROCEDURE — 99214 OFFICE O/P EST MOD 30 MIN: CPT | Performed by: STUDENT IN AN ORGANIZED HEALTH CARE EDUCATION/TRAINING PROGRAM

## 2025-06-30 ASSESSMENT — TONOMETRY
OD_IOP_MMHG: 16
OS_IOP_MMHG: 21
OS_IOP_MMHG: 14

## 2025-06-30 ASSESSMENT — PACHYMETRY
OD_CT_UM: 530
OD_CT_CORRECTION: 1
OS_CT_CORRECTION: 1
OS_CT_UM: 530

## 2025-06-30 ASSESSMENT — VISUAL ACUITY
OS_BCVA: 20/50
OD_BCVA: 20/40

## 2025-06-30 ASSESSMENT — CONFRONTATIONAL VISUAL FIELD TEST (CVF)
OS_FINDINGS: FULL
OD_FINDINGS: FULL

## 2025-07-09 ENCOUNTER — OFFICE (OUTPATIENT)
Dept: URBAN - METROPOLITAN AREA CLINIC 12 | Facility: CLINIC | Age: 70
Setting detail: OPHTHALMOLOGY
End: 2025-07-09
Payer: COMMERCIAL

## 2025-07-09 ENCOUNTER — RX ONLY (RX ONLY)
Age: 70
End: 2025-07-09

## 2025-07-09 DIAGNOSIS — H40.1133: ICD-10-CM

## 2025-07-09 PROBLEM — H40.1112 POAG; RIGHT EYE MODERATE, LEFT EYE MODERATE: Status: ACTIVE | Noted: 2025-07-09

## 2025-07-09 PROBLEM — H40.1122 POAG; RIGHT EYE MODERATE, LEFT EYE MODERATE: Status: ACTIVE | Noted: 2025-07-09

## 2025-07-09 PROCEDURE — 99214 OFFICE O/P EST MOD 30 MIN: CPT | Performed by: STUDENT IN AN ORGANIZED HEALTH CARE EDUCATION/TRAINING PROGRAM

## 2025-07-09 ASSESSMENT — KERATOMETRY
OD_K1POWER_DIOPTERS: 43.25
METHOD_AUTO_MANUAL: AUTO
OD_AXISANGLE_DEGREES: 107
OD_K2POWER_DIOPTERS: 43.75
OS_AXISANGLE_DEGREES: 087
OS_K1POWER_DIOPTERS: 43.25
OS_K2POWER_DIOPTERS: 44.75

## 2025-07-09 ASSESSMENT — VISUAL ACUITY
OS_BCVA: 20/50
OD_BCVA: 20/30-1

## 2025-07-09 ASSESSMENT — REFRACTION_AUTOREFRACTION
OD_AXIS: 112
OS_CYLINDER: -1.00
OS_SPHERE: +1.00
OS_AXIS: 084
OD_SPHERE: +1.25
OD_CYLINDER: -0.75

## 2025-07-09 ASSESSMENT — PACHYMETRY
OD_CT_CORRECTION: 1
OS_CT_CORRECTION: 1
OD_CT_UM: 530
OS_CT_UM: 530

## 2025-07-09 ASSESSMENT — TONOMETRY
OD_IOP_MMHG: 17
OS_IOP_MMHG: 15

## 2025-07-09 ASSESSMENT — LID POSITION - PTOSIS
OS_PTOSIS: LUL 1+
OD_PTOSIS: RUL 1+

## 2025-07-09 ASSESSMENT — CONFRONTATIONAL VISUAL FIELD TEST (CVF)
OD_FINDINGS: FULL
OS_FINDINGS: FULL

## 2025-07-09 ASSESSMENT — LID POSITION - DERMATOCHALASIS
OD_DERMATOCHALASIS: RUL 2+
OS_DERMATOCHALASIS: LUL 2+

## 2025-08-17 ENCOUNTER — OFFICE (OUTPATIENT)
Dept: URBAN - METROPOLITAN AREA CLINIC 12 | Facility: CLINIC | Age: 70
Setting detail: OPHTHALMOLOGY
End: 2025-08-17
Payer: COMMERCIAL

## 2025-08-17 DIAGNOSIS — H40.1112: ICD-10-CM

## 2025-08-17 DIAGNOSIS — H40.1122: ICD-10-CM

## 2025-08-17 PROCEDURE — 99213 OFFICE O/P EST LOW 20 MIN: CPT | Performed by: STUDENT IN AN ORGANIZED HEALTH CARE EDUCATION/TRAINING PROGRAM

## 2025-08-17 ASSESSMENT — CONFRONTATIONAL VISUAL FIELD TEST (CVF)
OD_FINDINGS: FULL
OS_FINDINGS: FULL

## 2025-08-17 ASSESSMENT — REFRACTION_AUTOREFRACTION
OD_CYLINDER: -0.50
OD_SPHERE: +0.75
OS_AXIS: 056
OS_CYLINDER: -1.00
OD_AXIS: 145
OS_SPHERE: +0.75

## 2025-08-17 ASSESSMENT — LID POSITION - PTOSIS
OS_PTOSIS: LUL 1+
OD_PTOSIS: RUL 1+

## 2025-08-17 ASSESSMENT — PACHYMETRY
OD_CT_UM: 530
OD_CT_CORRECTION: 1
OS_CT_CORRECTION: 1
OS_CT_UM: 530

## 2025-08-17 ASSESSMENT — KERATOMETRY
OS_K2POWER_DIOPTERS: 44.50
OS_K1POWER_DIOPTERS: 43.25
METHOD_AUTO_MANUAL: AUTO
OD_K1POWER_DIOPTERS: 43.50
OD_AXISANGLE_DEGREES: 105
OD_K2POWER_DIOPTERS: 44.50
OS_AXISANGLE_DEGREES: 091

## 2025-08-17 ASSESSMENT — VISUAL ACUITY
OS_BCVA: 20/50
OD_BCVA: 20/40

## 2025-08-17 ASSESSMENT — TONOMETRY
OD_IOP_MMHG: 17
OS_IOP_MMHG: 17

## 2025-08-17 ASSESSMENT — LID POSITION - DERMATOCHALASIS
OD_DERMATOCHALASIS: RUL 2+
OS_DERMATOCHALASIS: LUL 2+